# Patient Record
Sex: MALE | Race: WHITE | NOT HISPANIC OR LATINO | URBAN - METROPOLITAN AREA
[De-identification: names, ages, dates, MRNs, and addresses within clinical notes are randomized per-mention and may not be internally consistent; named-entity substitution may affect disease eponyms.]

---

## 2023-11-01 ENCOUNTER — APPOINTMENT (OUTPATIENT)
Dept: URBAN - METROPOLITAN AREA CLINIC 308 | Age: 58
Setting detail: DERMATOLOGY
End: 2023-11-01

## 2023-11-01 DIAGNOSIS — L72.0 EPIDERMAL CYST: ICD-10-CM

## 2023-11-01 PROCEDURE — OTHER ADDITIONAL NOTES: OTHER

## 2023-11-01 PROCEDURE — 99202 OFFICE O/P NEW SF 15 MIN: CPT

## 2023-11-01 PROCEDURE — OTHER COUNSELING: OTHER

## 2023-11-01 PROCEDURE — OTHER DEFER: OTHER

## 2023-11-01 PROCEDURE — OTHER MIPS QUALITY: OTHER

## 2023-11-01 ASSESSMENT — LOCATION SIMPLE DESCRIPTION DERM: LOCATION SIMPLE: POSTERIOR NECK

## 2023-11-01 ASSESSMENT — LOCATION DETAILED DESCRIPTION DERM
LOCATION DETAILED: RIGHT LATERAL NECK
LOCATION DETAILED: MID POSTERIOR NECK

## 2023-11-01 ASSESSMENT — LOCATION ZONE DERM: LOCATION ZONE: NECK

## 2023-11-01 NOTE — PROCEDURE: DEFER
Size Of Lesion In Cm (Optional): 4
Reason To Defer Override: refer to [facility]
Detail Level: Detailed
Introduction Text (Please End With A Colon): The following procedure was deferred:
X Size Of Lesion In Cm (Optional): 2.5
X Size Of Lesion In Cm (Optional): 1.5
Size Of Lesion In Cm (Optional): 1

## 2023-11-01 NOTE — PROCEDURE: ADDITIONAL NOTES
Additional Notes: Cysts on posterior neck x years but larger cyst is recently becoming tender when lying down. There is no erythema or drainage noted on exam today. We discussed I&D of lesion but due to size and location, recommend he see surgeon for the procedure. Pt advised we will refer to [facility] for removal Additional Notes: Cysts on posterior neck x years but larger cyst is recently becoming tender when lying down. There is no erythema or drainage noted on exam today. We discussed I&D of lesion but due to size and location, recommend he see surgeon for the procedure. Pt advised we will refer to [facility] for removal